# Patient Record
Sex: FEMALE | Race: WHITE | Employment: FULL TIME | URBAN - NONMETROPOLITAN AREA
[De-identification: names, ages, dates, MRNs, and addresses within clinical notes are randomized per-mention and may not be internally consistent; named-entity substitution may affect disease eponyms.]

---

## 2018-02-06 ENCOUNTER — APPOINTMENT (OUTPATIENT)
Dept: ULTRASOUND IMAGING | Age: 23
End: 2018-02-06
Payer: COMMERCIAL

## 2018-02-06 ENCOUNTER — HOSPITAL ENCOUNTER (EMERGENCY)
Age: 23
Discharge: HOME | End: 2018-02-06
Attending: EMERGENCY MEDICINE
Payer: COMMERCIAL

## 2018-02-06 VITALS
SYSTOLIC BLOOD PRESSURE: 128 MMHG | DIASTOLIC BLOOD PRESSURE: 79 MMHG | OXYGEN SATURATION: 100 % | HEART RATE: 70 BPM | TEMPERATURE: 98.3 F | RESPIRATION RATE: 22 BRPM | WEIGHT: 170 LBS | BODY MASS INDEX: 25.76 KG/M2 | HEIGHT: 68 IN

## 2018-02-06 DIAGNOSIS — N93.8 DYSFUNCTIONAL UTERINE BLEEDING: Primary | ICD-10-CM

## 2018-02-06 LAB
ALBUMIN SERPL-MCNC: 4.3 G/DL (ref 3.5–5.1)
ALP BLD-CCNC: 62 U/L (ref 38–126)
ALT SERPL-CCNC: 16 U/L (ref 11–66)
AMPHETAMINE+METHAMPHETAMINE URINE SCREEN: NEGATIVE
ANION GAP SERPL CALCULATED.3IONS-SCNC: 15 MEQ/L (ref 8–16)
AST SERPL-CCNC: 17 U/L (ref 5–40)
BARBITURATE QUANTITATIVE URINE: NEGATIVE
BASOPHILS # BLD: 0.8 %
BASOPHILS ABSOLUTE: 0.1 THOU/MM3 (ref 0–0.1)
BENZODIAZEPINE QUANTITATIVE URINE: NEGATIVE
BILIRUB SERPL-MCNC: 0.2 MG/DL (ref 0.3–1.2)
BILIRUBIN DIRECT: < 0.2 MG/DL (ref 0–0.3)
BILIRUBIN URINE: NEGATIVE
BLOOD, URINE: NEGATIVE
BUN BLDV-MCNC: 7 MG/DL (ref 7–22)
CALCIUM SERPL-MCNC: 9.2 MG/DL (ref 8.5–10.5)
CANNABINOID QUANTITATIVE URINE: NEGATIVE
CHARACTER, URINE: CLEAR
CHLAMYDIA TRACHOMATIS BY RT-PCR: NOT DETECTED
CHLORIDE BLD-SCNC: 100 MEQ/L (ref 98–111)
CO2: 23 MEQ/L (ref 23–33)
COCAINE METABOLITE QUANTITATIVE URINE: NEGATIVE
COLOR: YELLOW
CREAT SERPL-MCNC: 0.6 MG/DL (ref 0.4–1.2)
CT/NG SOURCE: NORMAL
EOSINOPHIL # BLD: 1.2 %
EOSINOPHILS ABSOLUTE: 0.1 THOU/MM3 (ref 0–0.4)
ETHYL ALCOHOL, SERUM: < 0.01 %
GFR SERPL CREATININE-BSD FRML MDRD: > 90 ML/MIN/1.73M2
GLUCOSE BLD-MCNC: 83 MG/DL (ref 70–108)
GLUCOSE URINE: NEGATIVE MG/DL
HCT VFR BLD CALC: 37 % (ref 37–47)
HEMOGLOBIN: 12.5 GM/DL (ref 12–16)
KETONES, URINE: NEGATIVE
KOH PREP: NORMAL
LEUKOCYTE ESTERASE, URINE: NEGATIVE
LIPASE: 73.7 U/L (ref 5.6–51.3)
LYMPHOCYTES # BLD: 32.3 %
LYMPHOCYTES ABSOLUTE: 3.5 THOU/MM3 (ref 1–4.8)
MAGNESIUM: 2 MG/DL (ref 1.6–2.4)
MCH RBC QN AUTO: 28.2 PG (ref 27–31)
MCHC RBC AUTO-ENTMCNC: 33.9 GM/DL (ref 33–37)
MCV RBC AUTO: 83.1 FL (ref 81–99)
MONOCYTES # BLD: 6.5 %
MONOCYTES ABSOLUTE: 0.7 THOU/MM3 (ref 0.4–1.3)
NEISSERIA GONORRHOEAE BY RT-PCR: NOT DETECTED
NITRITE, URINE: NEGATIVE
NUCLEATED RED BLOOD CELLS: 0 /100 WBC
OPIATES, URINE: NEGATIVE
OSMOLALITY CALCULATION: 272.8 MOSMOL/KG (ref 275–300)
OXYCODONE: NEGATIVE
PDW BLD-RTO: 14 % (ref 11.5–14.5)
PH UA: 6.5
PHENCYCLIDINE QUANTITATIVE URINE: NEGATIVE
PLATELET # BLD: 322 THOU/MM3 (ref 130–400)
PMV BLD AUTO: 8.9 FL (ref 7.4–10.4)
POTASSIUM SERPL-SCNC: 3.9 MEQ/L (ref 3.5–5.2)
PREGNANCY, SERUM: NEGATIVE
PROTEIN UA: NEGATIVE
RBC # BLD: 4.45 MILL/MM3 (ref 4.2–5.4)
SEG NEUTROPHILS: 59.2 %
SEGMENTED NEUTROPHILS ABSOLUTE COUNT: 6.4 THOU/MM3 (ref 1.8–7.7)
SODIUM BLD-SCNC: 138 MEQ/L (ref 135–145)
SPECIFIC GRAVITY, URINE: 1.03 (ref 1–1.03)
TOTAL PROTEIN: 7.1 G/DL (ref 6.1–8)
TRICHOMONAS PREP: NORMAL
TROPONIN T: < 0.01 NG/ML
TSH SERPL DL<=0.05 MIU/L-ACNC: 4.18 UIU/ML (ref 0.4–4.2)
UROBILINOGEN, URINE: 1 EU/DL
WBC # BLD: 10.8 THOU/MM3 (ref 4.8–10.8)

## 2018-02-06 PROCEDURE — 87220 TISSUE EXAM FOR FUNGI: CPT

## 2018-02-06 PROCEDURE — 84703 CHORIONIC GONADOTROPIN ASSAY: CPT

## 2018-02-06 PROCEDURE — 84484 ASSAY OF TROPONIN QUANT: CPT

## 2018-02-06 PROCEDURE — 76830 TRANSVAGINAL US NON-OB: CPT

## 2018-02-06 PROCEDURE — G0480 DRUG TEST DEF 1-7 CLASSES: HCPCS

## 2018-02-06 PROCEDURE — 83735 ASSAY OF MAGNESIUM: CPT

## 2018-02-06 PROCEDURE — 83690 ASSAY OF LIPASE: CPT

## 2018-02-06 PROCEDURE — 81003 URINALYSIS AUTO W/O SCOPE: CPT

## 2018-02-06 PROCEDURE — 80053 COMPREHEN METABOLIC PANEL: CPT

## 2018-02-06 PROCEDURE — 85025 COMPLETE CBC W/AUTO DIFF WBC: CPT

## 2018-02-06 PROCEDURE — 80307 DRUG TEST PRSMV CHEM ANLYZR: CPT

## 2018-02-06 PROCEDURE — 87491 CHLMYD TRACH DNA AMP PROBE: CPT

## 2018-02-06 PROCEDURE — 99284 EMERGENCY DEPT VISIT MOD MDM: CPT

## 2018-02-06 PROCEDURE — 93975 VASCULAR STUDY: CPT

## 2018-02-06 PROCEDURE — 87070 CULTURE OTHR SPECIMN AEROBIC: CPT

## 2018-02-06 PROCEDURE — 87205 SMEAR GRAM STAIN: CPT

## 2018-02-06 PROCEDURE — 84443 ASSAY THYROID STIM HORMONE: CPT

## 2018-02-06 PROCEDURE — 87210 SMEAR WET MOUNT SALINE/INK: CPT

## 2018-02-06 PROCEDURE — 82248 BILIRUBIN DIRECT: CPT

## 2018-02-06 PROCEDURE — 36415 COLL VENOUS BLD VENIPUNCTURE: CPT

## 2018-02-06 PROCEDURE — 87591 N.GONORRHOEAE DNA AMP PROB: CPT

## 2018-02-06 ASSESSMENT — ENCOUNTER SYMPTOMS
WHEEZING: 0
RHINORRHEA: 0
BACK PAIN: 0
EYE PAIN: 0
COUGH: 0
VOMITING: 0
SHORTNESS OF BREATH: 0
SORE THROAT: 0
NAUSEA: 0
EYE DISCHARGE: 0
DIARRHEA: 0
ABDOMINAL PAIN: 0

## 2018-02-06 ASSESSMENT — PAIN SCALES - GENERAL: PAINLEVEL_OUTOF10: 0

## 2018-02-06 ASSESSMENT — PAIN DESCRIPTION - PAIN TYPE: TYPE: ACUTE PAIN

## 2018-02-06 ASSESSMENT — PAIN DESCRIPTION - ORIENTATION: ORIENTATION: LOWER

## 2018-02-06 ASSESSMENT — PAIN DESCRIPTION - LOCATION: LOCATION: ABDOMEN;PELVIS

## 2018-02-06 NOTE — ED NOTES
Patient resting on cot, awaiting update from provider. RN will notify provider of request.  Will continue to monitor patient.       [address] [place] [address]  02/06/18 8233

## 2018-02-06 NOTE — ED TRIAGE NOTES
Patient arrived to ED with c/o vaginal bleeding/pelvic pain since giving birth (3.5 months ago) and having symptoms of sinusitis and yeast infection. Patient has had vaginal bleeding since giving birth 3.5 months ago, followed up with her OB and was unable to complete a pelvic exam d/t the pain. Patient reports that she had been breastfeeding while this is happening as well. Patient reports having cramping along with the bleeding. Patient was started on 2 different birth controls to possibly control bleeding, Depo shot and started on sprintec. When the patient began the medication it stopped the bleeding and then started bleeding again. Patient is scheduled for a procedure to help with this pain. While this has been going on, the patient started having symptoms of sinusitis and was started on antibiotics resulting in a yeast infection.   Patient reports that the yeast infection has been bad and the discharge is \"not normal.\"

## 2018-02-06 NOTE — ED PROVIDER NOTES
normal.   Eyes: Conjunctivae are normal. Right eye exhibits no discharge. Left eye exhibits no discharge. No scleral icterus. Neck: Normal range of motion. Neck supple. No JVD present. Cardiovascular: Normal rate, regular rhythm and normal heart sounds. Exam reveals no gallop and no friction rub. No murmur heard. Pulmonary/Chest: Effort normal and breath sounds normal. No respiratory distress. She has no decreased breath sounds. She has no wheezes. She has no rhonchi. She has no rales. Abdominal: Soft. She exhibits no distension. There is no tenderness. There is no rebound and no guarding. Musculoskeletal: Normal range of motion. She exhibits no edema. Neurological: She is alert and oriented to person, place, and time. She exhibits normal muscle tone. She displays no seizure activity. GCS eye subscore is 4. GCS verbal subscore is 5. GCS motor subscore is 6. Skin: Skin is warm and dry. No rash noted. She is not diaphoretic. Psychiatric: She has a normal mood and affect. Her behavior is normal. Thought content normal.   Nursing note and vitals reviewed. DIFFERENTIAL DIAGNOSIS:   Differential diagnoses were discussed extensively with the patient and family including but not limited to uterine fibroids, retained products of conception, endometriosis, endometritis, cervicitis. DIAGNOSTIC RESULTS     EKG: All EKG's are interpreted by the Emergency Department Physician who either signs or Co-signs this chart in the absence of a cardiologist.  EKG interpreted by Angelina Wong DO:    None    RADIOLOGY: non-plain film images(s) such as CT, Ultrasound and MRI are read by the radiologist.    US NON OB TRANSVAGINAL   Final Result      Small amount of fluid in the anterior lower uterine segment endometrial canal and endocervical canal.   Otherwise unremarkable pelvic ultrasound. **This report has been created using voice recognition software.  It may contain minor errors which are inherent in voice recognition technology. **       Final report electronically signed by Dr. Melissa Abreu on 2/6/2018 5:40 AM      US DUP ABD PEL RETRO SCROT COMPLETE   Final Result      Small amount of fluid in the anterior lower uterine segment endometrial canal and endocervical canal.   Otherwise unremarkable pelvic ultrasound. **This report has been created using voice recognition software. It may contain minor errors which are inherent in voice recognition technology. **       Final report electronically signed by Dr. Melissa Abreu on 2/6/2018 5:40 AM          LABS:   Labs Reviewed   HEPATIC FUNCTION PANEL - Abnormal; Notable for the following:        Result Value    Total Bilirubin 0.2 (*)     All other components within normal limits   LIPASE - Abnormal; Notable for the following:     Lipase 73.7 (*)     All other components within normal limits   OSMOLALITY - Abnormal; Notable for the following:     Osmolality Calc 272.8 (*)     All other components within normal limits   KOH (SKIN,HAIR,NAILS)    Narrative:     Source: vaginal non-OB patient       Site:           Current Antibiotics: not stated   C. TRACHOMATIS / N. GONORRHOEAE, DNA   WET PREP, GENITAL    Narrative:     Source: vaginal non-OB patient       Site:           Current Antibiotics: not stated   GENITAL CULTURE   CBC WITH AUTO DIFFERENTIAL   BASIC METABOLIC PANEL   TROPONIN   MAGNESIUM   HCG, SERUM, QUALITATIVE   TSH WITHOUT REFLEX   ETHANOL   URINE DRUG SCREEN   URINE RT REFLEX TO CULTURE   ANION GAP   GLOMERULAR FILTRATION RATE, ESTIMATED       EMERGENCY DEPARTMENT COURSE:   Vitals:    Vitals:    02/06/18 0323 02/06/18 0523 02/06/18 0550   BP: 138/83 125/82 128/79   Pulse: 72  70   Resp: 21  22   Temp: 98.3 °F (36.8 °C)     TempSrc: Oral     SpO2: 100% 98% 100%   Weight: 170 lb (77.1 kg)     Height: 5' 8\" (1.727 m)       4:03 AM: The patient was seen and evaluated. Appropriate labs and imaging were ordered and reviewed.  Patient has had this problem since the birth of her youngest which is approximately 6 months. She has been evaluated by her ObGyn and she has seen a specialist ObGyn and I told her that I would not be able to find any clear etiology for this. I reviewed all labs and imaging. The ultrasound revealed some fluid in the uterine cavity but other than that no thickening of the endometrial stripe. There is no ovarian torsion. Patient on physical examination had some old blood but no new extravasation. Labs were reviewed she is not anemic. At this point I feel the patient can be safely discharged home she is given the name of new ObGyn specialist follow-up with. She is instructed to continue take all her current medications as prescribed. She is instructed to return to the emergency room immediately for any new or worsening complaints. Patient understood and agreed with the plan. Patient is subsequently discharged home in good condition. Patient has what appears to be dysfunctional uterine bleeding. The patient is instructed to follow-up with the ObGyn as given above. Patient is instructed to continue to take all her medications as prescribed. Patient is instructed follow-up with her primary care physician and schedule an appointment within the next 1-2 days. Patient is instructed return the emergency room for any new or worsening complaints. CRITICAL CARE:   None. CONSULTS:  None. PROCEDURES:  None    FINAL IMPRESSION      1.  Dysfunctional uterine bleeding          DISPOSITION/PLAN   Discharge      PATIENT REFERRED TO:  Barbie Wong, Helga N E Dino [facility]  [address]  [address] [address]  [phone]    Call in 2 days        DISCHARGE MEDICATIONS:  New Prescriptions    No medications on file       (Please note that portions of this note were completed with a voice recognition program.  Efforts were made to edit the dictations but occasionally words are mis-transcribed.)    Scribe:  Philomena Gosselin 2/6/18 4:03 AM

## 2018-02-09 LAB
GENITAL CULTURE, ROUTINE: NORMAL
GRAM STAIN RESULT: NORMAL

## 2018-02-10 ENCOUNTER — TELEPHONE (OUTPATIENT)
Dept: PHARMACY | Age: 23
End: 2018-02-10

## 2018-02-27 ENCOUNTER — NURSE TRIAGE (OUTPATIENT)
Dept: ADMINISTRATIVE | Age: 23
End: 2018-02-27

## 2018-02-27 NOTE — TELEPHONE ENCOUNTER
Reason for Disposition   [1] Periods with > 6 soaked pads or tampons per day AND [2] last > 7 days    Additional Information   Commented on: All Negative - See Physician within 24 Hours     In addition to having chest pain that she has ignored for about 2 weeks. States that her GYN will not be in town till Friday and his office told her to go to the ER. Advised to follow those instruction. Advised to try to get someone to take care of the baby, so there is not a need to take the baby around the illness of there Er. Answer Assessment - Initial Assessment Questions  1. AMOUNT: \"Describe the bleeding that you are having. \"     - SPOTTING: spotting, or pinkish / brownish mucous discharge; does not fill panti-liner or pad     - MILD:  less than 1 pad / hour; less than patient's usual menstrual bleeding    - MODERATE: 1-2 pads / hour; small-medium blood clots (e.g., pea, grape, small coin)     - SEVERE: soaking 2 or more pads/hour for 2 or more hours; bleeding not contained by pads or continuous red blood from vagina; large blood clots (e.g., golf ball, large coin)       Mild(5-6 pads per days plus clotting,   2. ONSET: \"When did the bleeding begin? \" \"Is it continuing now? \"      4 momths ago with delevery- placed on Sprintex approx 1-2 months ago- has not changed her bleeding   3. MENSTRUAL PERIOD: \"When was the last normal menstrual period? \" \"How is this different than your period? \"      Before conception of 2 month old   3. REGULARITY: \"How regular are your periods? \"      na  5. ABDOMINAL PAIN: \"Do you have any pain? \" \"How bad is the pain? \"  (e.g., Scale 1-10; mild, moderate, or severe)    - MILD (1-3): doesn't interfere with normal activities, abdomen soft and not tender to touch     - MODERATE (4-7): interferes with normal activities or awakens from sleep, tender to touch     - SEVERE (8-10): excruciating pain, doubled over, unable to do any normal activities       none  6.  PREGNANCY: \"Could you be

## 2018-09-11 ENCOUNTER — HOSPITAL ENCOUNTER (EMERGENCY)
Age: 23
Discharge: HOME | End: 2018-09-11
Payer: COMMERCIAL

## 2018-09-11 ENCOUNTER — APPOINTMENT (OUTPATIENT)
Dept: CT IMAGING | Age: 23
End: 2018-09-11
Payer: COMMERCIAL

## 2018-09-11 VITALS
TEMPERATURE: 98.6 F | BODY MASS INDEX: 25.74 KG/M2 | SYSTOLIC BLOOD PRESSURE: 108 MMHG | HEIGHT: 67 IN | OXYGEN SATURATION: 100 % | DIASTOLIC BLOOD PRESSURE: 77 MMHG | HEART RATE: 70 BPM | RESPIRATION RATE: 17 BRPM | WEIGHT: 164 LBS

## 2018-09-11 DIAGNOSIS — S09.90XA CLOSED HEAD INJURY, INITIAL ENCOUNTER: Primary | ICD-10-CM

## 2018-09-11 LAB
AMPHETAMINE+METHAMPHETAMINE URINE SCREEN: NEGATIVE
BACTERIA: ABNORMAL
BARBITURATE QUANTITATIVE URINE: NEGATIVE
BENZODIAZEPINE QUANTITATIVE URINE: NEGATIVE
BILIRUBIN URINE: NEGATIVE
BLOOD, URINE: NEGATIVE
CANNABINOID QUANTITATIVE URINE: NEGATIVE
CASTS: ABNORMAL /LPF
CASTS: ABNORMAL /LPF
CHARACTER, URINE: ABNORMAL
COCAINE METABOLITE QUANTITATIVE URINE: NEGATIVE
COLOR: YELLOW
CRYSTALS: ABNORMAL
EPITHELIAL CELLS, UA: ABNORMAL /HPF
GLUCOSE, URINE: NEGATIVE MG/DL
KETONES, URINE: NEGATIVE
LEUKOCYTE ESTERASE, URINE: NEGATIVE
MISCELLANEOUS LAB TEST RESULT: ABNORMAL
NITRITE, URINE: NEGATIVE
OPIATES, URINE: NEGATIVE
OXYCODONE: NEGATIVE
PH UA: 6
PHENCYCLIDINE QUANTITATIVE URINE: NEGATIVE
PREGNANCY, URINE: NEGATIVE
PROTEIN UA: NEGATIVE MG/DL
RBC URINE: ABNORMAL /HPF
RENAL EPITHELIAL, UA: ABNORMAL
SPECIFIC GRAVITY UA: 1.03 (ref 1–1.03)
UROBILINOGEN, URINE: 0.2 EU/DL
WBC UA: ABNORMAL /HPF
YEAST: ABNORMAL

## 2018-09-11 PROCEDURE — 70450 CT HEAD/BRAIN W/O DYE: CPT

## 2018-09-11 PROCEDURE — 81001 URINALYSIS AUTO W/SCOPE: CPT

## 2018-09-11 PROCEDURE — 81025 URINE PREGNANCY TEST: CPT

## 2018-09-11 PROCEDURE — 80307 DRUG TEST PRSMV CHEM ANLYZR: CPT

## 2018-09-11 PROCEDURE — 6370000000 HC RX 637 (ALT 250 FOR IP): Performed by: NURSE PRACTITIONER

## 2018-09-11 PROCEDURE — 99284 EMERGENCY DEPT VISIT MOD MDM: CPT

## 2018-09-11 RX ORDER — NAPROXEN 500 MG/1
500 TABLET ORAL 2 TIMES DAILY
Qty: 60 TABLET | Refills: 0 | Status: SHIPPED | OUTPATIENT
Start: 2018-09-11

## 2018-09-11 RX ORDER — ACETAMINOPHEN 325 MG/1
650 TABLET ORAL ONCE
Status: COMPLETED | OUTPATIENT
Start: 2018-09-11 | End: 2018-09-11

## 2018-09-11 RX ORDER — NAPROXEN 250 MG/1
500 TABLET ORAL ONCE
Status: COMPLETED | OUTPATIENT
Start: 2018-09-11 | End: 2018-09-11

## 2018-09-11 RX ORDER — AMITRIPTYLINE HYDROCHLORIDE 10 MG/1
10 TABLET, FILM COATED ORAL NIGHTLY
COMMUNITY

## 2018-09-11 RX ADMIN — ACETAMINOPHEN 650 MG: 325 TABLET ORAL at 05:04

## 2018-09-11 RX ADMIN — NAPROXEN 500 MG: 250 TABLET ORAL at 05:46

## 2018-09-11 ASSESSMENT — ENCOUNTER SYMPTOMS
SHORTNESS OF BREATH: 0
ABDOMINAL PAIN: 0
SORE THROAT: 0
RHINORRHEA: 0
BACK PAIN: 0
BLOOD IN STOOL: 0
CONSTIPATION: 0
NAUSEA: 0
DIARRHEA: 0
VOMITING: 0
CHEST TIGHTNESS: 0
WHEEZING: 0
COUGH: 0

## 2018-09-11 ASSESSMENT — PAIN DESCRIPTION - DESCRIPTORS
DESCRIPTORS: POUNDING
DESCRIPTORS: POUNDING

## 2018-09-11 ASSESSMENT — PAIN SCALES - GENERAL
PAINLEVEL_OUTOF10: 6

## 2018-09-11 ASSESSMENT — PAIN DESCRIPTION - PROGRESSION
CLINICAL_PROGRESSION: NOT CHANGED
CLINICAL_PROGRESSION: NOT CHANGED

## 2018-09-11 ASSESSMENT — PAIN DESCRIPTION - FREQUENCY
FREQUENCY: CONTINUOUS
FREQUENCY: CONTINUOUS

## 2018-09-11 ASSESSMENT — PAIN DESCRIPTION - ONSET
ONSET: ON-GOING
ONSET: ON-GOING

## 2018-09-11 ASSESSMENT — PAIN DESCRIPTION - PAIN TYPE
TYPE: ACUTE PAIN
TYPE: ACUTE PAIN

## 2018-09-11 ASSESSMENT — PAIN DESCRIPTION - LOCATION
LOCATION: HEAD
LOCATION: HEAD

## 2018-09-11 NOTE — ED PROVIDER NOTES
[facility]  eMERGENCY dEPARTMENT eNCOUnter          CHIEF COMPLAINT       Chief Complaint   Patient presents with    Head Injury       Nurses Notes reviewed and I agree except as noted in the HPI. HISTORY OF PRESENT ILLNESS    Farida Arce is a 26 y.o. female who presents to the Emergency Department for the evaluation of a head injury. 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 She denies that she lost consciousness from the injury or that she was wearing a helmet at the time. Patient states that she has since been experiencing a headache which she rates at a 6/10 in severity and describes as a continuous throbbing sensation. She denies experiencing any nausea, emesis, neck pain, visual changes, weakness, numbness, or tingling. Patient denies further complaints at initial encounter. Location/Symptom: Headache  Timing/Onset: 1:20 AM  Context/Setting: Patient hit the top of her head on a rail  Quality: Throbbing  Duration: Continuous  Modifying Factors: None  Severity: 6/10    The HPI was provided by the patient. REVIEW OF SYSTEMS     Review of Systems   Constitutional: Negative for appetite change, chills, diaphoresis, fatigue and fever. HENT: Negative for congestion, rhinorrhea and sore throat. Respiratory: Negative for cough, chest tightness, shortness of breath and wheezing. Cardiovascular: Negative for chest pain, palpitations and leg swelling. Gastrointestinal: Negative for abdominal pain, blood in stool, constipation, diarrhea, nausea and vomiting. Genitourinary: Negative for difficulty urinating, dysuria and hematuria. Musculoskeletal: Negative for back pain, joint swelling, neck pain and neck stiffness. Skin: Negative for pallor and rash. Neurological: Positive for headaches. Negative for dizziness, syncope, weakness, light-headedness and numbness.    Hematological: Co-signs this chart in the absence of a cardiologist.    None    RADIOLOGY: non-plain film images(s) such as CT, Ultrasound and MRI are read by the radiologist.    CT Head WO Contrast   Final Result    No evidence of an acute process. **This report has been created using voice recognition software. It may contain minor errors which are inherent in voice recognition technology. **      Final report electronically signed by Dr. Vanessa Sterling on 9/11/2018 5:26 AM          LABS:     Labs Reviewed   URINALYSIS WITH MICROSCOPIC - Abnormal; Notable for the following:        Result Value    Character, Urine CLOUDY (*)     All other components within normal limits   URINE DRUG SCREEN   PREGNANCY, URINE       EMERGENCY DEPARTMENT COURSE:   Vitals:    Vitals:    09/11/18 0409 09/11/18 0507   BP: 121/82 108/77   Pulse: 69 70   Resp: 16 17   Temp: 98.6 °F (37 °C)    TempSrc: Oral    SpO2: 98% 100%   Weight: 164 lb (74.4 kg)    Height: 5' 7\" (1.702 m)        4:52 AM: The patient was seen and evaluated. MDM:  The patient was seen within the ED today for the evaluation of a head injury. The patient arrived in no acute distress and in stable condition. Within the department, I observed the patient's vital signs to be within acceptable range. On exam, I appreciated normal heart and lung sounds, that the patient was neurologically intact, and no cervical tenderness with full ROM. Radiological studies within the department revealed no acute findings. Laboratory work was reassuring. Within the department, the patient was treated with tylenol and naprosyn for pain. I observed the patient's condition to improve during the duration of her stay. I explained my proposed course of treatment to the patient, who was amenable to my decision, and I answered all questions that were asked.  She was discharged home in stable condition with a prescription for naprosyn, and the patient will return to the ED if her symptoms become more severe in nature or otherwise change. I advised the patient to follow-up with Occupational Health as soon as possible. I also discussed return to ED precautions with the patient who verbalized understanding. CRITICAL CARE:   None     CONSULTS:  None    PROCEDURES:  None    FINAL IMPRESSION      1. Closed head injury, initial encounter          DISPOSITION/PLAN   Patient was discharged in stable condition    PATIENT REFERRED TO:  Nay Vergara[address] [facility] [address] [phone]  Schedule an appointment as soon as possible for a visit         DISCHARGE MEDICATIONS:  New Prescriptions    NAPROXEN (NAPROSYN) 500 MG TABLET    Take 1 tablet by mouth 2 times daily       (Please note that portions of this note were completed with a voice recognition program.  Efforts were made to edit the dictations but occasionally words are mis-transcribed.)    The patient was given an opportunity to see the Emergency Attending. The patient voiced understanding that I was a Mid-Level Provider and was in agreement with being seen independently by myself. Scribe:  Edenilson Forte 9/11/18 4:52 AM Scribing for and in the presence of Sharad Conner CNP. Signed by: Cruz Echeverria, 09/11/18 5:39 AM    Provider:  I personally performed the services described in the documentation, reviewed and edited the documentation which was dictated to the scribe in my presence, and it accurately records my words and actions.     Sharad Conner CNP 9/11/18 5:39 AM       AMY Alvarez CNP  09/11/18 [id number]

## 2018-09-11 NOTE — ED NOTES
Pt arrives to ED today via triage desk with c/o head injury. Pt states she was at work attempting to stand up on her lift when she hit the top left of her head on the roof of the machine. Pt states she was wearing no head protection on impact. Pt states she did not lose consciousness, but was having some issues remembering time. Pt states injury occurred at 48 prior to arrival. Pt has no visible signs of uncontrolled hemorrhage, has full range of motion of neck, and has no obvious lacerations or malformation. Pt states she had the same thing happen last week at work with similar symptoms. Assessment and vitals as documented.      Juanita Mohr, [place]  09/11/18 4794

## 2022-08-24 ENCOUNTER — TELEPHONE (OUTPATIENT)
Dept: CARDIOLOGY CLINIC | Age: 27
End: 2022-08-24